# Patient Record
(demographics unavailable — no encounter records)

---

## 2019-03-12 NOTE — NUR
PT RESTING WITH HOB ELEVATED ON O2@2LPM VIA NC. PT SITTING UP DRINKING SODA IN
NO DISTRESS AT THIS TIME.VSS

## 2019-03-12 NOTE — NUR
ASSESSMENT DONE . TELE IN PLACE READING SR 76 PER ED. 02 AT 2L VIA NC. SAFETY
PRECAUTIONS REINFORCED AND CALL LIGHT IN REACH.

## 2019-03-12 NOTE — NUR
SPOKE TO DR. GRIGGS AND NOTIFIED HIM OF PT'S REQUESTS FOR NEB TX'S AND OF
THERE BEING NO MORE ANTIBIOTICS OR STEROIDS TO BE GIVEN ONLY ONES IN ER; NEW
ORDERS RECEIVED AND TO BE CARRIED OUT;

## 2019-03-12 NOTE — NUR
PT. SITTING UP IN BED WITH EXERTIONAL SOB; PT. REQUESTING NEB TX'S; WILL CALL
MD FOR FURTHER ORDERS; O2 INFUSING PER NC @2LITERS/MIN PER NC; ASSESSMENT
COMPLETED; PO FLUIDS OFFERED; EDEMA NOTED TO BLE AND ELEVATED ONTO PILLOW;
ENCOURAGED TO CALL FOR ANY NEEDS; CALL LIGHT IS IN REACH; WILL CONTINUE TO
MONITOR.

## 2019-03-12 NOTE — NUR
PT. SITTING UP IN BED WITH O2 INFUSING PER NC; C/O RLE SHIN PAIN; SWELLING
UNCHANGED FROM ASSESSMENT TO BLE; APPLIED BRANDON HOSE TO BLE AT THIS TIME AND
ELEVATED ONTO PILLOW; PULSES PRESENT; INSTRUCTED TO CALL FOR ANY NEEDS;VOICES
NO CONCERNS; CALL LIGHT IS IN REACH.

## 2019-03-13 NOTE — NUR
PT. C/O BACK PAIN; MEDICATED WITH ORDERED PRN PO DILAUDID; WILL REASSESS;
ASSESSMENT COMPLETED; IV SITE PATENT AND SL; FLUSHES WELL; PO FLUIDS OFFERED;
O2 INFUSING PER NC AS PER ORDER; PT. INSTRUCTED TO CALL FOR ANY NEEDS; CALL
LIGHT IS IN REACH;

## 2019-03-13 NOTE — NUR
DR. GRIGGS AT BEDSIDE TO ASESS PT AND DISCUSS POC. PT VERBALIZED
UNDERSTANDING. PT STATED PAIN IN BACK 8/10. PT DENIES ANY OTHER NEEDS AT THIS
TIME. CALL LIGHT IN REACH.

## 2019-03-13 NOTE — NUR
PT. C/O BACK PAIN ; MEDICATED WITH ORDERED DILAUDID PO AS PER ORDER; PT.,
REPORTS THAT PAIN WAS RELIEVED TO LLE AFTER BRANDON HOSE PLACED; DENIES FURTHER
NEEDS; CALL LIGHT IS IN REACH.

## 2019-03-13 NOTE — NUR
ASSESSMENT DONE .TELE IN PLACE. PT IS A&O X3. 02 2L VIA NC. 20 RAC THAT
APPEARS HEALTHY. PT DENIES ANY NEEDS AT THIS TIME.  SAFETY PRECAUTIONS
REINFORCED AND CALL LIGHT IN REACH.

## 2019-03-13 NOTE — NUR
PT. C/O 7/10 BACK PAIN AND MILD PAIN TO LLE; MEDICATED WITH ORDERED PO
DILAUDID; WILL REASSESS; CALL LIGHT IS IN REACH; WILL CONTINUE TO MONITOR.

## 2019-03-14 NOTE — NUR
ASSESSMENT IS COMPLETED; IV SITE IS FREE FROM REDNESS OR EDEMA. HR IS REG,
PULSES ARE STRONG X4, ABD IS SOFT WITH ACTIVE BS.BREATH SOUNDS ARE CLEAR
BILATERALLY. O2 @ 2LITERS WITH NC. CONTINUE TO OSEBRVE AND MONITOR

## 2019-03-14 NOTE — NUR
PT. SITTING UP IN BED WATCHING TV; MEDICATED WITH ORDERED PRN RESTORIL AS PER
PTS REQUEST; DENIES FURTHER NEEDS; CALL LIGHT IS IN REACH.

## 2019-03-14 NOTE — NUR
PT. SITTING UP IN BED WATCHING TV; O2 INFUSING PER NC AS PER ORDER; ENCOURAGED
TO CALL FOR ANY NEEDS; PT. C/O BACK PAN 10/10; MEDICATED WITH ORDERED DILAUDID
PO; WILL REASSESS; DENIES FURTHER NEEDS; CALL LIGHT IS IN REACH; WILL CONTINUE
TO MONITOR.

## 2019-03-14 NOTE — NUR
PT. MEDICATED FOR BACK PAIN WITH ORDERED PRN PO DILAUDID; WILL REASSESS; PO
FLUIDS OFFERED; COMMODE EMPTIED; ENCOURAGED TO CALL FOR ANY NEEDS; CALL LIGHT
IS IN REACH; WILL CONTINUE TO MONITOR.

## 2019-03-15 NOTE — NUR
ASSESSMENT IS COMPLETED: IV SITE IS FREE FROM REDNESS OR EDEMA. BREATH SOUNDS
ARE DIMINSHED. HR IS REG, PULSES ARE STRONG X4, ABD IS SOFT WITH ACTIVE BS.
TELE MONITOR IN PLACE. CALL BELL WITHIN REACH. CONTINUE TO OBSERVE AND
MONITOR.

## 2019-03-15 NOTE — NUR
PT. C/O BACK PAIN ; MEDICATED WITH ORDERED DILAUDID PO; ICE CHIPS PROVIDED;
COMMODE EMPTIED; PT. DENIES FURTHER NEEDS; CALL LIGHT IS IN REACH; WILL
CONTINUE TO MONITOR.

## 2019-03-15 NOTE — NUR
PT RECEIVED DISCHARGE INSTRUCTIONS. IV SITE DISCONITNUED CATHETER INTACT. NO
REDNESS OR EDEMA.VERBALIZED UNDERSTANDING.
Discharge instructions given. Patient verbalizes understanding of same.
Discharged in stable condition via Wheelchair to Home with
family. All belongings sent with pt.

## 2019-03-15 NOTE — NUR
PT IS RELAXING IN BED WITH NO DISTRESS NOTED. IV SITE IS FREE FROM REDNESS OR
EDEMA. TELE MONITOR IN PLACE. CONTINUE TO OSBERVE AND MONITOR.

## 2019-03-15 NOTE — NUR
IV SITE SLIGHTLY LEAKING WHEN FLUSHING; REMOVED AT THIS TIME; CATHETER TIP
INTACT; NEW IV SITE STARTED TO LH #22 GAUGE AND AM LABS OBTAINED; COFFEE
PROVIDED PER PT'S REQUEST; VOICES NO CONCERNS; CALL LIGHT IS IN REACH.

## 2019-12-01 NOTE — NUR
SPUTUM SPECIMEN OBTAINED AND SENT TO LAB. PT TOOK PM MEDICATIONS WITHOUT
DIFFICULY INCLUDING RESTORIL. ASKING FOR COKE. NO OTHER REQUESTS OR CONCERNS
AT THIS TIME.

## 2019-12-01 NOTE — NUR
PT RESTING IN BED, EYES CLOSED. OFFERS NO COMPLAINTS AT THIS TIME. CALL LIGHT
IN REACH. WILL MONITOR

## 2019-12-01 NOTE — NUR
REPORT RECEIVED FROM COSTA RAINEY. PT SITTING UP IN BED COLORING; ALERT AND
ORIENTED. C/O SOME MILD PAIN TO ANTERIOR LEFT SHIN AND MID BACK. RESPIRATIONS
EVEN AND UNLABORED ON OXYGEN 2L VIA NC; WHEEZING THROUGOUT LUNGS. PT STATES
THAT HER BREATHING IS MUCH BETTER. PLAN OF CARE REVIEWED. PT ENCOURAGED TO
VERBALIZE CONCERNS. STATES UNDERSTANDING. SAFETY MEASURES IN PLACE. CALL LIGHT
WITHIN REACH.

## 2019-12-01 NOTE — NUR
PT ADMITTED TO ICU BED 5 FROM ED, TRANSPORTED VIA STRETCHER, PT TRANSFERRED
SELF FROM STRETCHER TO BED. PT DENIES CP. PT SOB WITH EXERTION, SA02@93% ON
02@2LPM VIA NC. LS WHEEZING THROUGHOUT. ABDOMEN DISTENDED, FIRM, NON-TENDER.
LBM 11-30-19. BSX4 ACTIVE. 20G EMS@LAC/SL, NO S/S OF INFILTRATE AT SITE NOTED
AT THIS TIME. PT REMAINS ON DROPLET PRECAUTIONS FOR FLU B. PT ORIENTED TO
UNIT, ROOM, CALL LIGHT. WILL MONITOR.

## 2019-12-01 NOTE — NUR
Admission Note
 
 
Report Given to:         COSTA RAINEY
Transported by:           Wheelchair          X Stretcher
 
Transported with:        X Nurse     Transporter   X Patent IV   X O2
                         X Cardiac Monitor

## 2019-12-01 NOTE — NUR
PT TOLERATING BIPAP, MONITORING DEVICES IN PLACE; IV LEVAQUIN INFUSING; PT
DENIES ANY NEEDS AT THIS TIME; VSS; WILL CONTINUE TO MONITOR

## 2019-12-01 NOTE — NUR
PT MEDICATED HYPERKALEMIA PER MAR, PT TOLERATED WELL; BIPAP CONTINUED; PT
DENIES ANY DISCOMFORT OR PAIN AT THIS

## 2019-12-02 NOTE — NUR
PURWIK CATHETER UNSUCCESSFUL; LINENS SOILED WITH URINE; FULL LINEN CHANGE AND
PT ASSITED WITH PARTIAL BATH. UP TO BS TO VOID 900ML OF CLEAR YELLOW URINE;
SPECIMEN SENT TO LAB. PT SOB WITH MODERATE WHEEZING ON EXERTION. ONLY REQUEST
AT THIS TIME IS FOR COFFEE. CALL LIGHT WITHIN REACH.

## 2019-12-02 NOTE — NUR
PT SITTING UP IN BED COLORING. RESP ARE EVEN AND UNLABORED. NO DISTRESS NOTED.
CALL LIGHT IN REACH. WILL CONTINUE TO MONITOR.

## 2019-12-02 NOTE — NUR
PT SITTING UP IN BED COLORING. PT IS ALERT AND ORIENTED X3. SHIFT ASSESSMENT
COMPLETED AT THIS TIME. IV PATENT X1. CALL LIGHT IN REACH. WILL CONTINUE TO
MONITOR.

## 2019-12-02 NOTE — NUR
PT ASLEEP AT THIS TIME WITH NO SIGNS OF DISTRESS. RESPIRATIONS EVEN AND
UNLABORED ON OXYGEN. REMAINS ON DROPLET PRECAUTIONS FOR FLU. REPOSITIONING
SELF IN BED AND USES CALL LIGHT PRN FOR ASSISTANCE. CALL LIGHT WITHIN REACH.

## 2019-12-02 NOTE — NUR
awake. writing. denies resp diff. o2 cont per nc. cardiac monitor shows sinus
rhythm hr 99. #20 lfa saline lock. po fluids taken well. voids per bsc. fall
& droplet precautions cont.

## 2019-12-02 NOTE — NUR
PT SITTING UP IN BED WATCHING TV AND COLORING. RESP ARE EVEN AND UNLABORED.NO
DISTRESS NOTED. CALL LIGHT IN REACH. WILL CONTINUE TO MONITOR

## 2019-12-02 NOTE — NUR
PT RESTING IN BED AWAKE WATCHING TV AND COLORING. RESP ARE EVEN AND UNLABORED.
NO DISTRESS NOTED. CALL LIGHT IN REACH. WILL CONTINUE TO MONITOR.

## 2019-12-03 NOTE — NUR
PT SITTING UP IN BED AWAKE. RESP ARE EVEN AND UNLABORED. NO DISTRESS NOTED.
CALL LIGHT IN REACH. WILL CONTINUE TO MONITOR.

## 2019-12-03 NOTE — NUR
PT RESTING IN BED AWAKE. PT IS ALERT AND ORIENTED X3. SHIFT ASSESSMENT
COMPLETED. IV PATENT X1. CALL LIGHT IN REACH. WILL CONTINUE TO MONITOR.

## 2020-09-12 NOTE — NUR
PT COMPLETED IV MEDS. RESP EVEN AND UNLABORED. LARGE CLEAR YELLOW URINE OUTPUT
VIA [place].PT UPDATED ON POC.

## 2020-09-12 NOTE — NUR
PT RESTING IN BED READING A BOOK ON CONTINUOUS O2@4LPM VIA NC. RESP EVEN AD UN
LABORED. AT THIS TIME. UPDATED ON POC.

## 2020-09-12 NOTE — NUR
MED LIST REVIEWED WITH PT AT THIS TIME PER PHYSICIAN'S REQUEST. PT WAS ABLE TO
LIST EACH MEDICATION AS LAST TAKEN AND AS ORDERED. MED REQ COMPLETED AT THIS
TIME AND PHYSICIAN NOTIFIED.  ORDERS RECEIVED.

## 2020-09-12 NOTE — NUR
REPORT RECEIVED FROM NURSE ALEJANDRINA. PT RESTING IN BED SUPINE; ASSESSMENT AND
VITALS COMPLETE ALERT AND ORIENTED.  PT DENIES PAIN. PTS HAS BUG BITES (PER
PATIENT) BI-LATERLLY. RESPIRATIONS EVEN AND UNLABORED ON 2 LITER OF 02
AT REST; SOB UPON EXERTION.TELEMENTRY IN PLACE, NSR; EDEMA BILATERALLY.
PT ENCOURAGED TO VERBALIZE CONCERNS. STATES UNDERSTANDING. SAFETY MEASURES IN
PLACE. CALL LIGHT WITHIN REACH.

## 2020-09-12 NOTE — NUR
PT ARRIVED TO [facility] ROOM 272 VIA PORTABLE IN STABLE CONDITION ACCOMPAINED BY
COSTA TROY. PT AMBULATED FROM PORTABLE TO BED WITH LITTLE DIFFCULTY.
INTRODUCED SELF TO PT AND DISCUSSED POC. ASESSMENT AND VITALS COMPLETED AT
THIS TIME. RESPIRATIONS ARE EVEN AND UNLABORED WITH NO SIGNS OF DISTRESS ON 4L
NC. PT O2 SATS 100%. LOWERED TO 3L NC, PT STILL SATING %. WRITTER THEN
LOWERED OXYGEN TO 2L NC. O2 SAT 98% BEFORE LEAVING ROOM.  LUNG SOUNDS ARE
DIMINISHED. PT COMPLAINS OF SOB ON EXCERTION. HEART RHYTHM IS NORMAL WITH TELE
IN PLACE. BOWEL SOUNDS ARE ACTIVE IN ALL QUADRANTS, LAST REPORTED BM
09/12/2020. RADIAL AND PEDAL PULSES ARE STRONG WITH NORMAL CAPILLARY REFILL.
#20G IN LFA FLUSHED AND IVF STARTED PER ORDER, SITE APPEARS HEALTHY AND
PATENT. PT INFORMS WRMARLON THAT SHE HAS CHRONIC PAIN MID STERNUM DUE TO
"COMPRESSION FRACTURES." PT PRESENTS WITH MULTIPLE SMALL BITE MARKS ON BLE AND
ARMS. PT STATES " I HAVE HAD A CRISOSTOMO PROBLEM FOR THE PAST MONTH." WRITTER
INFORMED OF PT ALLERGIES TO PENICILLINS, SULFA DRUGS AND PROPOXYPHENE, ALLERGY
BAND APPLIED ALONG WITH FALL RISK STICKER. PT ORIENTED TO ROOM AND CALL LIGHT
SYSTEM. PT HOME O2 IN ROOM ON COUNTER. ALL SAFETY PRECAUTIONS ARE IN PLACE
WITH CALL LIGHT AND BSC IN PLACE.  ENCOURAGED PT TO CALL . WILL CONTINUE TO
MONITOR.

## 2020-09-12 NOTE — NUR
PATIENT RESTING IN STRETCHER AND DENIES ANY NEEDS. RESP RATE 22, SPO2 96% ON 2L
NC. PT DENIES ANY NEEDS CALL BELL WITHIN REACH.

## 2020-09-12 NOTE — NUR
PT RESTING IN SEMI FOWLERS POSTITION UPON ENTERING ROOM. RESPIRATIONS ARE
EVEN AND UNLABORED WITH NO SIGNS OF DISTRESS. IVF RUNNING PER ORDER, SITE
APPEARS HEALTHY AND PATENT. PT DENIES OF ANY PAIN OR DISCOMFORTS. ALL SAEFTY
PRCAUTIONS ARE IN PLACE WITH CALL LIGHT IN REACH AND BSC NEAR.  WILL CONTINUE
TO MONITOR.

## 2020-09-13 NOTE — NUR
ASSESSMENT IS COMPLETED: IV SITE IS FREE FROM REDNESS OR EDEMA. HR IS
REG,PULSES ARE STRONG X4, ABD IS SOFT WITH ACTIVE BS. BREATH SOUNDS ARE
DININISHED. TELE MONITOR IN VAL[facility]IE CE

## 2020-09-13 NOTE — NUR
PHYSICAL ASSESSMENT UNCHANGED FROM BEGINNING OF SHIFT. PT AFEBRILE. PT DENIES
NEEDS AT THIS TIME. IV PATENT WITH NS RUNNING  PER HR. TELEMENTRY IN
PLACE.  ITEMS REMAIN WITHIN REACH; BED LOCKED IN LOW POSITION W/ BEDRAILS UP
X 2. CALL BELL REMAINS WIITHIN REACH. PT AGREES TO CALL FOR ANY NEEDS.

## 2020-09-13 NOTE — NUR
PT AWAKE WATCHING TV, PROVIDED CUP OF COFFEE PER REQUEST. NO S/O DISTRESS AT
THIS TIME. ENCOURAGED PT TO CALL AS NEEDS ARISE.

## 2020-09-13 NOTE — NUR
REPORT RECEIVED FROM NURSE SANJAY. PT RESTING IN BED SUPINE; ASSESSMENT AND
VITALS COMPLETE ALERT AND ORIENTED.  PT DENIES PAIN.  RESPIRATIONS EVEN AND
UNLABORED ON 2 LITER OF 02 AT REST; SOB UPON EXERTION.TELEMENTRY IN PLACE,
NSR; EDEMA BILATERALLY.  PT ENCOURAGED TO VERBALIZE CONCERNS. STATES
UNDERSTANDING. SAFETY MEASURES IN PLACE. CALL LIGHT WITHIN REACH.

## 2020-09-14 NOTE — NUR
PT RESTING IN CHAIR AT BEDSIDE, ALERT AND ORIENTED. RESPIRATIONS ARE EVEN AND
UNLABORED ON O2 @ 3L VIA NC. LUNGS SOUND DIMINISHED. PEDAL PULSES ARE STRONG.
PT REPORTS MILD PAIN BUT DENIES NEEDING ANY THING FOR HER PAIN AT THIS TIME.
SAFETY PRECAUTIONS IN PLACE. WILLCONTINUE TO MONITOR.

## 2020-09-14 NOTE — NUR
REPORT RECEIVED FROM COSTA STODDARD. PT UP TO BSC AND SITTING UP ON EDGE OF BED;
ALERT AND OREINTED. C/O CHRONIC MODERATE BACK PAIN. RESPIRATIONS SLIGHTLY
LABORED WITH EXERTIONAL SOB TO BSC; MILD AUDIBLE WHEEZING NOTED; LUNGS
DIMINISHED THROUGHOUT; OXYGEN IN PLACE 3L ON NC; SPO2 97%. PT PLEASANT AND
SMILING. TELE ON. IV FLUIDS INFUSING AT KVO; IV SITE APPEARS HEALTHY. PLAN OF
CARE REVIEWED. PT ENCOURAGED TO VERBALIZE CONCERNS. STATES UNDERSTANDING.
SAFETY MEASURES IN PLACE. CALL LIGHT WITHIN REACH.

## 2020-09-14 NOTE — NUR
PT REWSTING IN THE BED WITH NO DISTRESS NOTED, IV SITE IS FREE FROM
REDNESS OR EDEMA. CALL BELL WITHIN REACH. WILL CONTINUE TO MONITOR.

## 2020-09-14 NOTE — NUR
REPORT RECEIVED FROM COSTA CHRISTOPHER. PT RESTING IN CHAIR AT BEDSIDE, NO S/S OF
DISTRESS AT THIS TIME. WILL CONTINUE TO MONITOR.

## 2020-09-15 NOTE — NUR
PT EDUCATED ON DISCHARGE INSTRUCTIONS AND NEW MEDICATION PREDNISONE. PT
VERBAILZED UNDERSTADING. PT EDUCTAED ON DECREASE OF BUMEX AND ADDITIONAL DOSES
OF BUMEX AND POTASSSIUM. PT VERBALIZED UNDERSTANDING. IV REMOVED WITH CATHATER
STILL INTACT.PT TOLERATED WELL. TELE MONITORUING REMOVED. ER NOTIFIED. PT
INFORMS MySQUARITTER THAT SHE WILL BE DRIVING HER SELF. CHANDLER TREVIZO NOTIFIED.
ALL SAFETY PRECAUTIONS REMAIN IN  PLACE. WILL CONTINUE TO MONITOR

## 2020-09-15 NOTE — NUR
PT RESTING IN RECYLINER AT THIS TIME. RESRPIATIONS ARE EVEN AND UNLABORED AT
THIS TIME.PT IS A/OX 3 .  RESPIRATORY CALL FOR BREATHING TREATMENT. PT DENEIES
ANY PAIN OR DISCOMFORTS AT THIS TIME. ALL SAFETY PRECAUTIONS ARE IN PLACE WITH
CALL LIGHT IN REACH. WILL CONTINUE TO MONITOR

## 2020-09-15 NOTE — NUR
Discharge instructions given. Patient verbalizes understanding of same.
Discharged in stable condition via Wheelchair to Home with
staff. All belongings sent with pt.
 
PT DISCHARGED WITH ALL DISCHARGE INSTRUCTIONS AND BELONGINGS. PT INSISTED ON
DRIVING HER SELF AFTER WRITTER SUGGESTED ON GETTING A RIDE. PT DISCHARGED VIA
WHEELCHAIR IN STABLE CONDITION ACCOMPAINED BY SHAHRAM PEREZ

## 2020-09-15 NOTE — NUR
PT ASSISTED TO WHEELCHAIR BY SHAHRAM PEREZ. PT PRESENTED TO BE VERY SOB ON 3L NC
. PT STATED " I JUST NEED TO CATCH MY BREATH FOR A MIN. " PT ABLE TO REST IN
WHEELCHAIR. WRITTER SUGGESTED FOR PT TO NOT DRIVE HOME DUE TO SOB. PT REFUSED
STATING "I CAN DRIVE I JUST NEED A MINUET TO SIT THERE."WRITTER INSTEADED ON
RESTING FOR A FEW. PT AGREEED. ALL SAFETY PRECAUTIONS REMAIN IN PLACE WITH
CALL LIGHT IN REACH.

## 2020-09-15 NOTE — NUR
RECIEVED REPORT FROM TL VILLAGOMEZ RN. PT RESTING IN SEMI FOWLERS POSITION UPON
ENTERING ROOM.INTRODUCED SELF TO PT AND DISCUSSED POC. PT IS A/O X3.ASESSMENT
AND VITALS COMPLETED AT THIS TIME. RESPIRATIONS ARE EVEN AND UNLABORED WITH NO
SIGNS OF DISTRESS NOTED ON 3L NC. OXYGEN LOWERED TO 2L NC DUE TO SATS %,
PT SATING 98% UPON LEAVING ROOM. PT EDUACTED TO CALL IF FEELING SOB. PT
VERBAILZED UNDERSTANDING. HEART RHYTHM IS NORMAL WITH TELE IN PLACE. BOWEL
SOUNDS ARE ACTIVE IN ALL QUADRANTS, LAST REPORTED BM 09/15/2020. RADIAL AND
PEDAL PULSES ARE STRONG WITH NORMAL CAPILLARY REFILL. #20 IN LW RUNNING WITH
IVF PER ORDER, SITE APPEARS HEALTHY AND PATENT. PT PRESENTS WITH BITES ON BLE
AND ARMS,PT STATES SHE HAS A "CRISOSTOMO INFESTATION." PT WAS PERVIOUSLY ON CONTACT
PRECAUTIONS BUT IS NOW ON STANDARD. PT DENIES ANY PAIN OR DISCOMFORTS AT THIS
TIME. ALL SAFETY PRECAUTIONS ARE IN PLACE WITH CALL LIGHT IN REACH. WILL
CONTINUE TO MONITOR

## 2020-09-17 NOTE — NUR
Patient called for Covid results. Notified her that Covid test was negative.
Patient states she has appointment with PCP next week. Advised patient to
continue with Covid prevention practices and return to ED with difficulty
breathing or other urgent needs. Patient verbalized understanding.

## 2020-11-05 NOTE — NUR
PATIENT ARRIVES VIA WHEELCHAIR ACCOMPANIED BY SUPERVISOR GAMAL. ON 2.5 L/MIN
NC. PT IS SOB WITH EXERTION, LABORED BREATHING. PT REPORTS SHE WEARS NC 3
L/MIN ANS SOB WITH EXERTION IS BASELINE. RH 22 G IV INTACT, NS AT 80 ML/HR
PATIENT HAS UNSTAEDY GAIT, GENERALIZED WEAKNESS. SHE REPORTS SHE FEEL
YESTERDAY, AND SHE REPORTS SHE IS TRYING TO GET A SCOOTER. NURSE ASSESSMENT
PERFORMED. O2 SAT GREATER THAN 95%. SR ON TELE. REPORTS SHE HAS BEEN AT [facility] 3
TIMES IN THE LAST THREE MONTHS. POC DISCUSSED. WEARS GLASSES. NO COMPLAINTS OF
PAIN. ABRASIONS ON BILAT LE, DRY/SCABBED. NP COUGH. REFUSES FLU OR PNA
VACCINE. HER DAUGHTER AND GRANDSON LIVE WITH HER. ADMISSION COMPLETED. SELF
REPOSITIONS. CALL LIGHT WITHIN REACH.

## 2020-11-05 NOTE — NUR
PATIENT UP TO BSC, ASSIST X1. VOIDED 400 ML, URINE YELLOW/CLOUDY. BACK TO BED
SAFELY. DOES HAVE STRESS INCONTINENCE. BED LINEN CHANGED. HAD DIFFICULTY
SWALLOWING POTASSIUM TABLET. TOLERATED LOVENOX INJECTION. NO OTHER NEEDS OR
CPMPLAINTS AT THIS TIME.

## 2020-11-05 NOTE — NUR
PATIENT IS TALKING ON THE PHONE, WARM DINNER PROVIDED WITH A COLA PER REQUEST.
NO ACUTE DISTRESS SHOWN.

## 2020-11-06 NOTE — NUR
pt transferred to med surg 284 via wc with portable o2 in stable condition; pt
belongings sent with pt; bedside update provided to SAMUEL Dewitt LPN

## 2020-11-06 NOTE — NUR
Dr Kahn notified per this writer in regards to pt request for citalopram and
spiriva; orders received and on chart

## 2020-11-06 NOTE — NUR
PT MEDICATED AND ASSSESSMENT COMPLETED AT THIS TIME. NO S/O DISTRESS NOTED. PT
DENIES ANY NEEDS AT THIS TIME. SNACK AND DRINK OFFERED/DENIED. ICEWATER AT
BEDSIDE.

## 2020-11-06 NOTE — NUR
PATIENT REQUESTS INHALER FOR SOB AND WHEEZING, PROVIDED. SITS IN HIGH
ZHU'S. O2 SATS GREATER THAN 95%.

## 2020-11-06 NOTE — NUR
daughter Araceli Messer called this writer; unable to provide passcode; pt give
this writer permission to give information; update provided; will continue to
monitor

## 2020-11-06 NOTE — NUR
awake in bed eating lunch; no apparent distress noted; pt offers no
complaints; denies needs to urinate; sr on monitor; iv intact; call light
within reach; will continue to monitor

## 2020-11-06 NOTE — NUR
PATIENT ASSISTED TO BSC, NOW SAFELY BACK IN BED. VOIDED 900 ML. ABLE TO
SWALLOW HER SYNTHROID. SOB WITH EXERTION. O2 SAT 99%. ENCOURAGED PURSED LIP
BREATHIN. CALL LIGHT WITHIN REACH.

## 2020-11-06 NOTE — NUR
pt resting in bed with eyes closed; no apparent distress noted; easily
aroused; pt alert and oriented; pt admits to pain to ble d/t "being cold";
denies need for pain meds; no n/v noted; resp even and unlabored; lungs with
wheezing throughout; skin color wnl; o2 per nc at 2.5L; no cough noted; hr
reg; strong pulses; trace edema noted to ble; sr on monitor; abd distended/
firm with bs present; no bm noted per writer; no urine to inspect at this
this time; bsc; #22 flushed and patent to rh; no redness or edema noted at
site; generalized scabs noted to ble; pt states d/t "doan bites"; plan of
care/ meds explained; call light within reach; will continue to monitor

## 2020-11-06 NOTE — NUR
awake in bed eating breakfast; no apparent distress noted; pt offers no
complaints; iv intact; sr on monitor; call light within reach; will continue
to monitor

## 2020-11-06 NOTE — NUR
PT ARRIVED TO MED/SURG ROOM 284 IN STABLE CONDITION VIA WHEELCHAIR ACCOMPANIED
BY CHICARN;PT ASSISTED TO BEDSIDE WITH X1 ASSIST;PT A&O X3,ORIENTED TO
ROOM AND CALL LIGHT SYSTEM;WT AND VS OBTAINED;PT DENIES ANY CURRENT PAIN OR
DISCOMFORTS,PAIN SCALE AND REPORTING EDUCATED;RESPIRATIONS EVEN AND UNLABORED
ON O2 @ 2.5L VIA NC, EXERTIONAL SOB NOTED;ABDOMEN DISTENDED/FIRM ON PALPATION
AND ACTIVE IN ALL 4 QUADRANTS;STRONG PEDAL PULSES;MULTIPLE HEALING SCABS NOTED
THROUGHOUT BODY PT REPORTS "CRISOSTOMO BITES";#22G TO RIGHT HAND FLUSHED AND
PATENT,SITE APPEARS HEALTHY;ALLERGY AND FALL BAND IN PLACE;TELE MONITORING IN
PLACE;FRESH WATER PROVIDED PER REQUEST;PT DENIES ANY ADDITIONAL NEEDS AND IS
ENCOURAGED TO CALL FOR ASSISTANCE IF NEEDED;FALL PRECAUTIONS IN PLACE WITH BED
IN THE LOWEST POSITION AND CALL LIGHT IN REACH;WILL CONTINUE TO MONITOR

## 2020-11-06 NOTE — NUR
awake in bed; no apparent distress noted; pt offers no complaints; iv intact
and patent; sr on monitor; call light within reach; will continue to monitor

## 2020-11-06 NOTE — NUR
PT RESTING IN SEMI FOWLERS POSITION;RESPIRATIONS EVEN AND UNLABORED ON O2 @
2.5L;PT DENIES ANY CURRENT PAIN OR NEEDS;TELE MONITORING IN PLACE;IV SITE
PATENT;PT ENCOURAGED TO CALL FOR ASSISTANCE IF NEEDED;FALL PRECAUTIONS IN
PLACE WITH CALL LIGHT IN REACH;WILL CONTINUE TO MONITOR

## 2020-11-07 NOTE — NUR
PATIENT D/C AT THIS TIME. D/C INSTRUCTIONS WENT OVER WITH PATIENT AND PATIENT
VERBALIZES UNDERSTANDING OF INSTRUCTIONS AND PATIENT ALSO INSTRUCTED TO GO TO
[facility] TO  ABX. (DOXYCYCLINE) AND TAKE MEDICATION AS
DIRECTED. PATIENT VERBALIZED UNDERSTANDING.

## 2020-11-07 NOTE — NUR
PATIENT SITTING UP IN BED ALERT AND ORIENTED WATCHING TV. PATIENT DENIES ANY
NEEDS AT THIS TIME. PATIENT SAFETY MEASURES IN PLACE CALL LIGHT WITHIN REACH
SIDERAILS UP X2. PATIENT DENIES ANY PAIN. O2 IS ON CURRENTLY AT 2.5 LITERS.
DENIES ANY SHORTNESS OF BREATH. PATIENT COUGH IS NON-PRODUCTIVE. PATIENT
REMAINS IN ISOLATION AT THIS TIME.

## 2020-11-07 NOTE — NUR
PT MEDICATED W/AM MEDICATION. SHE WAS SLEEPING, BUT AWOKE TO MY VOICE. NO S/O
DISTRESS NOTED. PT REPORTED FEELING OKAY, ASKED FOR COFFEE, WILL PROVIDE.

## 2020-11-07 NOTE — NUR
PATIENT IN BED WATCHING TV DENIES ANY NEEDS AT THIS TIME. SAFETY MEASURES IN
PLACE CALL LIGHT NEAR, SIDERAILS UP X 2. PATIENT DENIES ANY PAIN.

## 2022-03-25 NOTE — NUR
FMLA paperwork received on desk by this nurse. Called Kirk to clarify FMLA need and to have him come into the office to fill out necessary paperwork before FMLA paperwork can be completed. Kirk's wife, Cynthia Dominguez, called our office and states she thinks his employer has already received all necessary FMLA paperwork, but she will call our office back to confirm. REPORT CALLED TO SELENE WESLEY MS